# Patient Record
Sex: FEMALE | Race: AMERICAN INDIAN OR ALASKA NATIVE
[De-identification: names, ages, dates, MRNs, and addresses within clinical notes are randomized per-mention and may not be internally consistent; named-entity substitution may affect disease eponyms.]

---

## 2022-07-11 NOTE — NUR
History, Chart, Medications and Allergies reviewed before start of
procedure. Patient states colon prep results clear.
Patient States Post-Procedure ride home has been arranged.

## 2022-07-11 NOTE — NUR
Patient up to Ambulate independently. Gait steady.
Discharge instructions reviewed with patient. Patient verbalizes understanding.
Copy given to patient to take home.
Discharged via wheelchair to private car for ride home W/.
DENIES PAIN OR NAUSEA. CONTINUES TO DRINK SIPS OF COFFEE

## 2022-07-11 NOTE — NUR
1015: PT NOTED TO HAVE A POSSIBLE SUPERFICIAL VT IN LEFT LOWER CALF (OUTSIDE),
DR. ACOSTA EVALUATED. PT STATES SHE HAS HAD THIS IN THE SAME LEG IN THE
PASET AND WAS TREATED WITH HEAT AND ASA. PT WILL F/U LATER TODAY WITH PCP. DR. DOYLE ALSO NOTIFIED.

## 2023-07-10 ENCOUNTER — HOSPITAL ENCOUNTER (INPATIENT)
Dept: HOSPITAL 95 - ER | Age: 63
LOS: 4 days | Discharge: HOME | DRG: 299 | End: 2023-07-14
Attending: HOSPITALIST | Admitting: HOSPITALIST
Payer: COMMERCIAL

## 2023-07-10 VITALS — DIASTOLIC BLOOD PRESSURE: 84 MMHG | SYSTOLIC BLOOD PRESSURE: 138 MMHG

## 2023-07-10 VITALS — DIASTOLIC BLOOD PRESSURE: 94 MMHG | SYSTOLIC BLOOD PRESSURE: 136 MMHG

## 2023-07-10 VITALS — HEIGHT: 69 IN | WEIGHT: 290 LBS | BODY MASS INDEX: 42.95 KG/M2

## 2023-07-10 VITALS — SYSTOLIC BLOOD PRESSURE: 150 MMHG | DIASTOLIC BLOOD PRESSURE: 91 MMHG

## 2023-07-10 DIAGNOSIS — G43.909: ICD-10-CM

## 2023-07-10 DIAGNOSIS — E66.01: ICD-10-CM

## 2023-07-10 DIAGNOSIS — I26.99: ICD-10-CM

## 2023-07-10 DIAGNOSIS — I82.811: ICD-10-CM

## 2023-07-10 DIAGNOSIS — Z88.5: ICD-10-CM

## 2023-07-10 DIAGNOSIS — M54.9: ICD-10-CM

## 2023-07-10 DIAGNOSIS — Z88.1: ICD-10-CM

## 2023-07-10 DIAGNOSIS — Z96.653: ICD-10-CM

## 2023-07-10 DIAGNOSIS — Z79.899: ICD-10-CM

## 2023-07-10 DIAGNOSIS — Z86.79: ICD-10-CM

## 2023-07-10 DIAGNOSIS — Z98.890: ICD-10-CM

## 2023-07-10 DIAGNOSIS — E03.9: ICD-10-CM

## 2023-07-10 DIAGNOSIS — Z79.01: ICD-10-CM

## 2023-07-10 DIAGNOSIS — Z87.891: ICD-10-CM

## 2023-07-10 DIAGNOSIS — Z90.49: ICD-10-CM

## 2023-07-10 DIAGNOSIS — I82.431: ICD-10-CM

## 2023-07-10 DIAGNOSIS — Z88.0: ICD-10-CM

## 2023-07-10 DIAGNOSIS — I82.411: Primary | ICD-10-CM

## 2023-07-10 DIAGNOSIS — Z90.710: ICD-10-CM

## 2023-07-10 DIAGNOSIS — I48.0: ICD-10-CM

## 2023-07-10 LAB
ALBUMIN SERPL BCP-MCNC: 3.3 G/DL (ref 3.4–5)
ALBUMIN/GLOB SERPL: 0.9 {RATIO} (ref 0.8–1.8)
ALT SERPL W P-5'-P-CCNC: 20 U/L (ref 12–78)
ANION GAP SERPL CALCULATED.4IONS-SCNC: 11 MMOL/L (ref 6–16)
AST SERPL W P-5'-P-CCNC: 33 U/L (ref 12–37)
BASOPHILS # BLD AUTO: 0.02 K/MM3 (ref 0–0.23)
BASOPHILS NFR BLD AUTO: 0 % (ref 0–2)
BILIRUB SERPL-MCNC: 0.4 MG/DL (ref 0.1–1)
BUN SERPL-MCNC: 14 MG/DL (ref 8–24)
CALCIUM SERPL-MCNC: 9.1 MG/DL (ref 8.5–10.1)
CHLORIDE SERPL-SCNC: 104 MMOL/L (ref 98–108)
CO2 SERPL-SCNC: 23 MMOL/L (ref 21–32)
CREAT SERPL-MCNC: 1.05 MG/DL (ref 0.4–1)
DEPRECATED RDW RBC AUTO: 45.1 FL (ref 35.1–46.3)
EOSINOPHIL # BLD AUTO: 0.07 K/MM3 (ref 0–0.68)
EOSINOPHIL NFR BLD AUTO: 1 % (ref 0–6)
ERYTHROCYTE [DISTWIDTH] IN BLOOD BY AUTOMATED COUNT: 14.1 % (ref 11.7–14.2)
GLOBULIN SER CALC-MCNC: 3.8 G/DL (ref 2.2–4)
GLUCOSE SERPL-MCNC: 98 MG/DL (ref 70–99)
HCT VFR BLD AUTO: 38.1 % (ref 33–51)
HGB BLD-MCNC: 12.4 G/DL (ref 11.5–16)
IMM GRANULOCYTES # BLD AUTO: 0.01 K/MM3 (ref 0–0.1)
IMM GRANULOCYTES NFR BLD AUTO: 0 % (ref 0–1)
LYMPHOCYTES # BLD AUTO: 1.84 K/MM3 (ref 0.84–5.2)
LYMPHOCYTES NFR BLD AUTO: 28 % (ref 21–46)
MCHC RBC AUTO-ENTMCNC: 32.5 G/DL (ref 31.5–36.5)
MCV RBC AUTO: 88 FL (ref 80–100)
MONOCYTES # BLD AUTO: 0.46 K/MM3 (ref 0.16–1.47)
MONOCYTES NFR BLD AUTO: 7 % (ref 4–13)
NEUTROPHILS # BLD AUTO: 4.27 K/MM3 (ref 1.96–9.15)
NEUTROPHILS NFR BLD AUTO: 64 % (ref 41–73)
NRBC # BLD AUTO: 0 K/MM3 (ref 0–0.02)
NRBC BLD AUTO-RTO: 0 /100 WBC (ref 0–0.2)
PLATELET # BLD AUTO: 213 K/MM3 (ref 150–400)
POTASSIUM SERPL-SCNC: 4.2 MMOL/L (ref 3.5–5.5)
PROT SERPL-MCNC: 7.1 G/DL (ref 6.4–8.2)
PROTHROMBIN TIME: 10.8 SEC (ref 9.7–11.5)
SODIUM SERPL-SCNC: 138 MMOL/L (ref 136–145)

## 2023-07-10 PROCEDURE — A9270 NON-COVERED ITEM OR SERVICE: HCPCS

## 2023-07-10 PROCEDURE — C8929 TTE W OR WO FOL WCON,DOPPLER: HCPCS

## 2023-07-10 NOTE — NUR
SHIFT SUMMARY
PATIENT BROUGHT UP TO HER ROOM FROM ER VIA GURNEY, ABLE TO TRANSFER TO HER BED
INDEPENDENTLY, A/OX4, IV HEPARIN INFUSING AT TIME OF ARRIVAL WITH ALMOST A
FULL BAG HANGING ON THE IV POLE. ADMISSION COMPLETE. NO ACUTE EVENTS THIS
SHIFT, CALL LIGHT IN REACH

## 2023-07-10 NOTE — NUR
NURSE NOTE
 
AWAKE. HEPARIN DRIP INFUSING AT 31.3 ML/HR. DENIES LOSS OF FEELING. VOICED
SOME SOB WHEN AMBULATING. DENIES STRESS AT THIS TIME. RECEIVED FIRE IGNITION
TEACHING RE NO SMOKING WHILE ON O2 AND NOT TO SMOKE IN THE HOSPITAL. CALL
LIGHT IN REACH. SIGNIFICANT OTHER AT BEDSIDE.

## 2023-07-11 VITALS — DIASTOLIC BLOOD PRESSURE: 88 MMHG | SYSTOLIC BLOOD PRESSURE: 155 MMHG

## 2023-07-11 VITALS — DIASTOLIC BLOOD PRESSURE: 89 MMHG | SYSTOLIC BLOOD PRESSURE: 127 MMHG

## 2023-07-11 VITALS — SYSTOLIC BLOOD PRESSURE: 116 MMHG | DIASTOLIC BLOOD PRESSURE: 82 MMHG

## 2023-07-11 VITALS — DIASTOLIC BLOOD PRESSURE: 90 MMHG | SYSTOLIC BLOOD PRESSURE: 120 MMHG

## 2023-07-11 LAB
ALBUMIN SERPL BCP-MCNC: 3 G/DL (ref 3.4–5)
ALBUMIN/GLOB SERPL: 0.9 {RATIO} (ref 0.8–1.8)
ALT SERPL W P-5'-P-CCNC: 17 U/L (ref 12–78)
ANION GAP SERPL CALCULATED.4IONS-SCNC: 9 MMOL/L (ref 6–16)
AST SERPL W P-5'-P-CCNC: 16 U/L (ref 12–37)
BILIRUB SERPL-MCNC: 0.4 MG/DL (ref 0.1–1)
BUN SERPL-MCNC: 10 MG/DL (ref 8–24)
CALCIUM SERPL-MCNC: 8.3 MG/DL (ref 8.5–10.1)
CHLORIDE SERPL-SCNC: 113 MMOL/L (ref 98–108)
CO2 SERPL-SCNC: 20 MMOL/L (ref 21–32)
CREAT SERPL-MCNC: 0.71 MG/DL (ref 0.4–1)
DEPRECATED RDW RBC AUTO: 44.2 FL (ref 35.1–46.3)
ERYTHROCYTE [DISTWIDTH] IN BLOOD BY AUTOMATED COUNT: 14.3 % (ref 11.7–14.2)
GLOBULIN SER CALC-MCNC: 3.4 G/DL (ref 2.2–4)
GLUCOSE SERPL-MCNC: 107 MG/DL (ref 70–99)
HCT VFR BLD AUTO: 35.4 % (ref 33–51)
HGB BLD-MCNC: 11.8 G/DL (ref 11.5–16)
MAGNESIUM SERPL-MCNC: 2.1 MG/DL (ref 1.6–2.4)
MCHC RBC AUTO-ENTMCNC: 33.3 G/DL (ref 31.5–36.5)
MCV RBC AUTO: 85 FL (ref 80–100)
NRBC # BLD AUTO: 0 K/MM3 (ref 0–0.02)
NRBC BLD AUTO-RTO: 0 /100 WBC (ref 0–0.2)
PLATELET # BLD AUTO: 212 K/MM3 (ref 150–400)
POTASSIUM SERPL-SCNC: 4 MMOL/L (ref 3.5–5.5)
PROT SERPL-MCNC: 6.4 G/DL (ref 6.4–8.2)
SODIUM SERPL-SCNC: 142 MMOL/L (ref 136–145)

## 2023-07-11 NOTE — NUR
PT'S  CALLED STAFF TO BEDSIDE DUE TO INCREASED BACK PAIN. PT WAS
STANDING AT BEDSIDE, HOLDING RIGHT RIBS. STATED IT "FELT DIFFERENT" AND SHE
WAS HAVING DIFFICULTY CATCHING HER BREATH. SATTING 96% ON RA. CALL TO TELE WHO
STATED PT'S HR INCREASED INTO 120S WITH MORE FREQUENT PVCS. ATTEMPTED EKG BUT
PT WAS UNABLE TO LAY FLAT. CALLED CHARGE RN TO BEDSIDE TO STAY WITH PT SO THIS
RN COULD CALL DR LARSON. SPOKE WITH DR LARSON WHO STATED SHE WOULD COME TO
BEDSIDE IN A FEW MINUTES. EKG WAS EVENTUALLY OBTAINED IN A SITTING POSITION.
PT STATES PAIN IS RESOLVING ON IT'S OWN BUT STILL HAVING DIFFICULTY TAKING A
DEEP BREATH.

## 2023-07-11 NOTE — NUR
SHIFT SUMMARY:
 
PT RESTING IN BED BUT INDEPENDENT IN ROOM. PT'S  HAS BEEN AT BEDSIDE
T/O DAY. MEDICATED X2 FOR PAIN WITH TYLENOL AND FLEXERIL. PT STATES FLEXIRIL
HAS BEEN REALLY HELPFUL FOR SPASMS SHE EXPERIENCED THIS AM. HEPARIN GTT
CONTINUES. PT ON 2L FOR COMFORT. NO ACUTE NEEDS OR CONCERNS AT THIS TIME.

## 2023-07-11 NOTE — NUR
NIGHT SHIFT SUMMARY
 
BP SLIGHTLY ELEVATED OTHERWISE VSS. HEPARIN DRIP CONTINUES AS PER MD ORDERS
FOR PE AND LEG DVT'S. VOICED DISCOMFORT OF RIGHT BACK, ROXICODONE 5 MG PO
ADMINISTERED, MED EFFECTIVE HAS BEEN RESTING QUIETLY WITH FEW INTERRUPTIONS
SINCE. CALL LIGHT IN REACH. WILL CONTINUE TO MONITOR.

## 2023-07-11 NOTE — NUR
NURSE NOTE
 
PT RECEIVED FIRE IGNITION - NO SMOKING ON OXYGEN, NO SMOKING IN HOSP EARLIER.
CALL LIGHT IN REACH

## 2023-07-11 NOTE — NUR
ASSUMED CARE:
 
PT RESTING IN BED AT THIS TIME. ON RA, TELE IN PLACE, , NSR. HEPARIN GTT
RUNNING AND CONFIRMED WITH ORDERS. NO ACUTE NEEDS OR CONCERNS AT THIS TIME.

## 2023-07-12 VITALS — DIASTOLIC BLOOD PRESSURE: 82 MMHG | SYSTOLIC BLOOD PRESSURE: 120 MMHG

## 2023-07-12 VITALS — DIASTOLIC BLOOD PRESSURE: 106 MMHG | SYSTOLIC BLOOD PRESSURE: 150 MMHG

## 2023-07-12 VITALS — SYSTOLIC BLOOD PRESSURE: 138 MMHG | DIASTOLIC BLOOD PRESSURE: 89 MMHG

## 2023-07-12 VITALS — DIASTOLIC BLOOD PRESSURE: 84 MMHG | SYSTOLIC BLOOD PRESSURE: 148 MMHG

## 2023-07-12 NOTE — NUR
NIGHT SHIFT SUMMARY
 
BP TRENDING UPWARD APPARENTLY DUE TO PAIN, OTHERWISE VSS. ANALGESICS
ADMINISTERD - SEE MAR FOR DETAILS. O2 SATS IN THE 90'S. LUNG SOUNDS LESS
DIMINISHED AS COMPARED TO 24 HR PREVIOUS PER AUSCULTATION. HEPARIN DRIP
CONTINUES. VOIDING QS. RECEIVED BACLOFEN AND ANALGESICS, MEDS EFFECTIVE.
ANXIETY DECREASED. HAS BEEN RESTING QUIETLY WITH OCCASIONAL INTERRUPTIONS.
CALL LIGHT IN REACH. WILL CONTINUE TO MONITOR.

## 2023-07-13 VITALS — DIASTOLIC BLOOD PRESSURE: 77 MMHG | SYSTOLIC BLOOD PRESSURE: 140 MMHG

## 2023-07-13 VITALS — DIASTOLIC BLOOD PRESSURE: 70 MMHG | SYSTOLIC BLOOD PRESSURE: 134 MMHG

## 2023-07-13 VITALS — SYSTOLIC BLOOD PRESSURE: 128 MMHG | DIASTOLIC BLOOD PRESSURE: 82 MMHG

## 2023-07-13 LAB
ANION GAP SERPL CALCULATED.4IONS-SCNC: 7 MMOL/L (ref 6–16)
BASOPHILS # BLD AUTO: 0.02 K/MM3 (ref 0–0.23)
BASOPHILS NFR BLD AUTO: 0 % (ref 0–2)
BUN SERPL-MCNC: 9 MG/DL (ref 8–24)
CALCIUM SERPL-MCNC: 8.9 MG/DL (ref 8.5–10.1)
CHLORIDE SERPL-SCNC: 110 MMOL/L (ref 98–108)
CO2 SERPL-SCNC: 23 MMOL/L (ref 21–32)
CREAT SERPL-MCNC: 0.9 MG/DL (ref 0.4–1)
DEPRECATED RDW RBC AUTO: 45 FL (ref 35.1–46.3)
EOSINOPHIL # BLD AUTO: 0.18 K/MM3 (ref 0–0.68)
EOSINOPHIL NFR BLD AUTO: 3 % (ref 0–6)
ERYTHROCYTE [DISTWIDTH] IN BLOOD BY AUTOMATED COUNT: 14 % (ref 11.7–14.2)
GLUCOSE SERPL-MCNC: 98 MG/DL (ref 70–99)
HCT VFR BLD AUTO: 35.5 % (ref 33–51)
HGB BLD-MCNC: 11.3 G/DL (ref 11.5–16)
IMM GRANULOCYTES # BLD AUTO: 0.01 K/MM3 (ref 0–0.1)
IMM GRANULOCYTES NFR BLD AUTO: 0 % (ref 0–1)
LYMPHOCYTES # BLD AUTO: 1.68 K/MM3 (ref 0.84–5.2)
LYMPHOCYTES NFR BLD AUTO: 30 % (ref 21–46)
MCHC RBC AUTO-ENTMCNC: 31.8 G/DL (ref 31.5–36.5)
MCV RBC AUTO: 88 FL (ref 80–100)
MONOCYTES # BLD AUTO: 0.49 K/MM3 (ref 0.16–1.47)
MONOCYTES NFR BLD AUTO: 9 % (ref 4–13)
NEUTROPHILS # BLD AUTO: 3.21 K/MM3 (ref 1.96–9.15)
NEUTROPHILS NFR BLD AUTO: 57 % (ref 41–73)
NRBC # BLD AUTO: 0 K/MM3 (ref 0–0.02)
NRBC BLD AUTO-RTO: 0 /100 WBC (ref 0–0.2)
PLATELET # BLD AUTO: 204 K/MM3 (ref 150–400)
POTASSIUM SERPL-SCNC: 4.3 MMOL/L (ref 3.5–5.5)
SODIUM SERPL-SCNC: 140 MMOL/L (ref 136–145)

## 2023-07-13 NOTE — NUR
SHIFT SUMMARY
PT RESTING QUIETLY AT START OF SHIFT. UP INDEPENDENTLY IN RM AND TO BTHRM. PT
REMAINS ON HEPARIN DRIP, PER PHARMACY. RATE UNCHANGED TO PRESENT THIS SHIFT.
PT'S  HERE THIS AM AND REMAINS AT BS. DR LARSON IN TO SEE PT A COUPLE
OF TIMES TODAY. NEW ORDERS PLACED. PT UP WITH THERAPY, ABLE TO AMBULATE IN RM
AND LATER IN HALLS WITH RT FOR HOME O2 EVAL. PT IMPROVING WITH POSSIBLE D/C
TOMORROW. DENIES NEEDS AT THIS TIME. CALL LT IN REACH. IGNITION RISK DISCUSSED
WITH PT WHILE ON O2. DENIES SMOKING; VERBALIZED UNDERSTANDING.

## 2023-07-13 NOTE — NUR
PATIENT SLEPT WELL OVERNIGHT AFTER RECEIVING 5MG FLEXARIL AND TYLENOL FOR 5/10
CHRONIC LOWER BACK PAIN.  HEPARIN GTT CONTINUES AT 17 UNITS/KG/HOUR OR 31.1ML
PER HOUR.  SPOUSE VERY INVOLVED (SOMEWHAT ANXIOUS) ABOUT HER TX, AND WOULD
LIKE TO BE INVOLVED IN ANY PATIENT EDUCATION OFFERED.  ROYA STATES SHE IS
FEELING MUCH BETTER AND NO LONGER SOB AT REST.  WILL CONTINUE CLOSE MONITORING

## 2023-07-14 ENCOUNTER — HOSPITAL ENCOUNTER (EMERGENCY)
Dept: HOSPITAL 95 - ER | Age: 63
Discharge: HOME | End: 2023-07-14
Payer: COMMERCIAL

## 2023-07-14 VITALS — SYSTOLIC BLOOD PRESSURE: 170 MMHG | DIASTOLIC BLOOD PRESSURE: 86 MMHG

## 2023-07-14 VITALS — SYSTOLIC BLOOD PRESSURE: 145 MMHG | DIASTOLIC BLOOD PRESSURE: 77 MMHG

## 2023-07-14 VITALS — SYSTOLIC BLOOD PRESSURE: 149 MMHG | DIASTOLIC BLOOD PRESSURE: 96 MMHG

## 2023-07-14 VITALS — SYSTOLIC BLOOD PRESSURE: 126 MMHG | DIASTOLIC BLOOD PRESSURE: 75 MMHG

## 2023-07-14 VITALS — BODY MASS INDEX: 42.95 KG/M2 | WEIGHT: 290 LBS | HEIGHT: 69 IN

## 2023-07-14 DIAGNOSIS — Z79.890: ICD-10-CM

## 2023-07-14 DIAGNOSIS — Z88.0: ICD-10-CM

## 2023-07-14 DIAGNOSIS — Z86.711: ICD-10-CM

## 2023-07-14 DIAGNOSIS — Z79.899: ICD-10-CM

## 2023-07-14 DIAGNOSIS — Z76.0: Primary | ICD-10-CM

## 2023-07-14 DIAGNOSIS — Z88.5: ICD-10-CM

## 2023-07-14 DIAGNOSIS — Z88.1: ICD-10-CM

## 2023-07-14 DIAGNOSIS — I48.91: ICD-10-CM

## 2023-07-14 DIAGNOSIS — Z79.01: ICD-10-CM

## 2023-07-14 LAB
HCT VFR BLD AUTO: 36.2 % (ref 33–51)
HGB BLD-MCNC: 11.7 G/DL (ref 11.5–16)
PLATELET # BLD AUTO: 234 K/MM3 (ref 150–400)

## 2023-07-14 PROCEDURE — A9270 NON-COVERED ITEM OR SERVICE: HCPCS

## 2023-07-14 NOTE — NUR
SHIFT SUMMARY
PT A&OX4 AND PLEASANT. PT C/O SEVERE BACK PAIN AT START OF SHIFT. MEDICATED
PER EMAR T/O NIGHT. PT'S PAIN SEEMED TO BE BETTER MANAGED TOWARED END OF
SHIFT. PT WAS ABLE TO GET A COUPLE HOURS OF SLEEP T/O NIGHT. UP TO BATHROOM
WITH ASSIST. O2 SATURATION WAS DROPPING WITH PAIN D/T SHALLOW BREATHS SO 2L OF
OXYGEN PLACED. MANTAINING O2 SAT IN THE MID 90'S HEPARIN CONTINUES TO RUN WITH
NO RATE CHANGE. DVT IN RIGHT LEG FELT COOL TO TOUCH AND PER PT, REDNESS HAS
DECREASED. VSS. BED IN LOWEST POSITION AND CALL LIGHT IN REACH.

## 2023-07-14 NOTE — NUR
PT AWAKE DURING SHIFT REPORT THIS AM. A&O, INDEPENDENT IN  AND TO BTHRM.
HEPARIN DRIP REMAINED UNCHANGED THRU THE NIGHT. DR LARSON IN TO SEE PT AND
DISCUSS PLAN OF CARE. D/C ORDERS LATER PLACED. XARELTO ORDERED AND GIVEN PER
EMAR AND THEN HEPARIIN DRIP STOPPED PER D/C ORDERS. MEDS FAXED TO [facility] PER PT REQUEST. D/C MEDS AND INSTRUCTIONS REVIEWED WITH PT; VERBALIZED
UNDERSTANDING. PT ASSISTED OUT TO FAMILY CAR VIA W/C.  TAKING ALL
BELONGINGS.